# Patient Record
Sex: FEMALE | Race: WHITE
[De-identification: names, ages, dates, MRNs, and addresses within clinical notes are randomized per-mention and may not be internally consistent; named-entity substitution may affect disease eponyms.]

---

## 2020-08-16 ENCOUNTER — HOSPITAL ENCOUNTER (EMERGENCY)
Dept: HOSPITAL 26 - MED | Age: 40
Discharge: HOME | End: 2020-08-16
Payer: COMMERCIAL

## 2020-08-16 VITALS — WEIGHT: 237 LBS | HEIGHT: 64 IN | BODY MASS INDEX: 40.46 KG/M2

## 2020-08-16 VITALS — SYSTOLIC BLOOD PRESSURE: 177 MMHG | DIASTOLIC BLOOD PRESSURE: 108 MMHG

## 2020-08-16 VITALS — DIASTOLIC BLOOD PRESSURE: 108 MMHG | SYSTOLIC BLOOD PRESSURE: 177 MMHG
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
## 2020-08-16 DIAGNOSIS — M25.522: Primary | ICD-10-CM

## 2020-08-16 DIAGNOSIS — K75.89: ICD-10-CM

## 2020-08-16 DIAGNOSIS — I10: ICD-10-CM

## 2020-08-16 PROCEDURE — 90471 IMMUNIZATION ADMIN: CPT

## 2020-08-16 PROCEDURE — 99284 EMERGENCY DEPT VISIT MOD MDM: CPT

## 2020-08-16 PROCEDURE — 73070 X-RAY EXAM OF ELBOW: CPT

## 2020-08-16 PROCEDURE — 96372 THER/PROPH/DIAG INJ SC/IM: CPT

## 2020-08-16 PROCEDURE — 90715 TDAP VACCINE 7 YRS/> IM: CPT
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
## 2020-09-07 ENCOUNTER — HOSPITAL ENCOUNTER (EMERGENCY)
Dept: HOSPITAL 26 - MED | Age: 40
Discharge: HOME | End: 2020-09-07
Payer: COMMERCIAL

## 2020-09-07 VITALS — HEIGHT: 64 IN | BODY MASS INDEX: 40.87 KG/M2 | WEIGHT: 239.38 LBS

## 2020-09-07 VITALS — DIASTOLIC BLOOD PRESSURE: 116 MMHG | SYSTOLIC BLOOD PRESSURE: 187 MMHG

## 2020-09-07 DIAGNOSIS — L03.115: ICD-10-CM

## 2020-09-07 DIAGNOSIS — I10: ICD-10-CM

## 2020-09-07 DIAGNOSIS — R21: ICD-10-CM

## 2020-09-07 DIAGNOSIS — L03.116: Primary | ICD-10-CM

## 2020-09-07 NOTE — NUR
Patient discharged with v/s stable. Written and verbal after care instructions 
given and explained. 

Patient alert, oriented and verbalized understanding of instructions. 
Ambulatory with steady gait. All questions addressed prior to discharge. ID 
band removed. Patient advised to follow up with PMD. Rx of LISINOPRIL, KEFLEX, 
PREDNISONE given. Patient educated on indication of medication including 
possible reaction and side effects. Opportunity to ask questions provided and 
answered.

## 2021-11-17 ENCOUNTER — HOSPITAL ENCOUNTER (EMERGENCY)
Dept: HOSPITAL 26 - MED | Age: 41
Discharge: HOME | End: 2021-11-17
Payer: COMMERCIAL

## 2021-11-17 VITALS — BODY MASS INDEX: 39.61 KG/M2 | HEIGHT: 64 IN | WEIGHT: 232 LBS

## 2021-11-17 VITALS — SYSTOLIC BLOOD PRESSURE: 152 MMHG | DIASTOLIC BLOOD PRESSURE: 98 MMHG

## 2021-11-17 VITALS — DIASTOLIC BLOOD PRESSURE: 84 MMHG | SYSTOLIC BLOOD PRESSURE: 136 MMHG

## 2021-11-17 DIAGNOSIS — J18.9: Primary | ICD-10-CM

## 2021-11-17 DIAGNOSIS — Z20.822: ICD-10-CM

## 2021-11-17 DIAGNOSIS — R00.0: ICD-10-CM

## 2021-11-17 DIAGNOSIS — I10: ICD-10-CM

## 2021-11-17 DIAGNOSIS — Z79.2: ICD-10-CM

## 2021-11-17 LAB
ALBUMIN FLD-MCNC: 2.9 G/DL (ref 3.4–5)
ANION GAP SERPL CALCULATED.3IONS-SCNC: 17.1 MMOL/L (ref 8–16)
AST SERPL-CCNC: 27 U/L (ref 15–37)
BILIRUB SERPL-MCNC: 0.9 MG/DL (ref 0–1)
BUN SERPL-MCNC: 6 MG/DL (ref 7–18)
CHLORIDE SERPL-SCNC: 96 MMOL/L (ref 98–107)
CO2 SERPL-SCNC: 23 MMOL/L (ref 21–32)
CREAT SERPL-MCNC: 0.8 MG/DL (ref 0.6–1.3)
ERYTHROCYTE [DISTWIDTH] IN BLOOD BY AUTOMATED COUNT: 14.2 % (ref 11.6–13.7)
GFR SERPL CREATININE-BSD FRML MDRD: 101 ML/MIN (ref 90–?)
GLUCOSE SERPL-MCNC: 119 MG/DL (ref 74–106)
HCT VFR BLD AUTO: 36.7 % (ref 36–48)
HGB BLD-MCNC: 12.6 G/DL (ref 12–16)
LYMPHOCYTES NFR BLD MANUAL: 11 % (ref 20–46)
MCH RBC QN AUTO: 30 PG (ref 27–31)
MCHC RBC AUTO-ENTMCNC: 34 G/DL (ref 33–37)
MCV RBC AUTO: 86.6 FL (ref 80–94)
MONOCYTES NFR BLD MANUAL: 10 % (ref 5–12)
PLATELET # BLD AUTO: 235 K/UL (ref 140–450)
POTASSIUM SERPL-SCNC: 4.1 MMOL/L (ref 3.5–5.1)
RBC # BLD AUTO: 4.24 MIL/UL (ref 4.2–5.4)
SODIUM SERPL-SCNC: 132 MMOL/L (ref 136–145)
WBC # BLD AUTO: 10.9 K/UL (ref 4.8–10.8)

## 2021-11-17 PROCEDURE — 71045 X-RAY EXAM CHEST 1 VIEW: CPT

## 2021-11-17 PROCEDURE — 96361 HYDRATE IV INFUSION ADD-ON: CPT

## 2021-11-17 PROCEDURE — 93005 ELECTROCARDIOGRAM TRACING: CPT

## 2021-11-17 PROCEDURE — 85025 COMPLETE CBC W/AUTO DIFF WBC: CPT

## 2021-11-17 PROCEDURE — 96374 THER/PROPH/DIAG INJ IV PUSH: CPT

## 2021-11-17 PROCEDURE — 87426 SARSCOV CORONAVIRUS AG IA: CPT

## 2021-11-17 PROCEDURE — 87804 INFLUENZA ASSAY W/OPTIC: CPT

## 2021-11-17 PROCEDURE — 36415 COLL VENOUS BLD VENIPUNCTURE: CPT

## 2021-11-17 PROCEDURE — U0003 INFECTIOUS AGENT DETECTION BY NUCLEIC ACID (DNA OR RNA); SEVERE ACUTE RESPIRATORY SYNDROME CORONAVIRUS 2 (SARS-COV-2) (CORONAVIRUS DISEASE [COVID-19]), AMPLIFIED PROBE TECHNIQUE, MAKING USE OF HIGH THROUGHPUT TECHNOLOGIES AS DESCRIBED BY CMS-2020-01-R: HCPCS

## 2021-11-17 PROCEDURE — 99285 EMERGENCY DEPT VISIT HI MDM: CPT

## 2021-11-17 PROCEDURE — 80053 COMPREHEN METABOLIC PANEL: CPT

## 2021-11-17 RX ADMIN — Medication ONE MG: at 08:58

## 2021-11-17 RX ADMIN — SODIUM CHLORIDE ONE MLS/HR: 9 INJECTION, SOLUTION INTRAVENOUS at 08:59

## 2021-11-17 RX ADMIN — SODIUM CHLORIDE ONE MG: 9 INJECTION, SOLUTION INTRAVENOUS at 08:59

## 2021-11-17 RX ADMIN — AZITHROMYCIN DIHYDRATE ONE MG: 250 TABLET, FILM COATED ORAL at 10:24
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
## 2021-11-17 NOTE — NUR
Patient discharged with v/s stable. Written and verbal after care instructions 
given PNEUMONIA and explained. 

Patient alert, oriented and verbalized understanding of instructions. 
Ambulatory with steady gait. All questions addressed prior to discharge. ID 
band removed. Patient advised to follow up with PMD. Rx of AZITHROMYCIN given. 
Patient educated on indication of medication including possible reaction and 
side effects. Opportunity to ask questions provided and answered.

## 2021-11-17 NOTE — NUR
-------------------------------------------------------------------------------

            *** Note undone in ED - 11/17/21 at 0835 by [facility] ***            

-------------------------------------------------------------------------------

patient ambulated to bed 2. EKG AT BEDSIDE.

## 2021-11-17 NOTE — NUR
-------------------------------------------------------------------------------

            *** Note undone in ED - 11/17/21 at 0835 by [facility] ***            

-------------------------------------------------------------------------------

patient ambulated to bed 2. EKG AT BEDSIDE.

## 2021-11-17 NOTE — NUR
IV ESTABLISHED TO LEFT HAND 20G, GOOD BLOOD RETURN, COLLECTED BLOOD, COLLECTED 
COVID JAMES, COVID NOVEL, INFL A&B GAVE TO [place]  AT PT BEDSIDE.

## 2023-04-05 ENCOUNTER — HOSPITAL ENCOUNTER (EMERGENCY)
Dept: HOSPITAL 26 - MED | Age: 43
Discharge: HOME | End: 2023-04-05
Payer: COMMERCIAL

## 2023-04-05 VITALS — DIASTOLIC BLOOD PRESSURE: 84 MMHG | SYSTOLIC BLOOD PRESSURE: 147 MMHG

## 2023-04-05 VITALS — BODY MASS INDEX: 38.93 KG/M2 | WEIGHT: 228 LBS | HEIGHT: 64 IN
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
## 2023-04-05 DIAGNOSIS — S83.8X1A: Primary | ICD-10-CM

## 2023-04-05 DIAGNOSIS — S63.591A: ICD-10-CM
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
## 2023-04-05 PROCEDURE — 73562 X-RAY EXAM OF KNEE 3: CPT

## 2023-04-05 PROCEDURE — 96372 THER/PROPH/DIAG INJ SC/IM: CPT

## 2023-04-05 PROCEDURE — 73110 X-RAY EXAM OF WRIST: CPT

## 2023-04-05 PROCEDURE — 99284 EMERGENCY DEPT VISIT MOD MDM: CPT

## 2024-02-27 ENCOUNTER — HOSPITAL ENCOUNTER (EMERGENCY)
Dept: HOSPITAL 27 - EMS | Age: 44
Discharge: HOME | End: 2024-02-27
Payer: COMMERCIAL

## 2024-02-27 VITALS — HEART RATE: 80 BPM | DIASTOLIC BLOOD PRESSURE: 95 MMHG | RESPIRATION RATE: 20 BRPM | SYSTOLIC BLOOD PRESSURE: 155 MMHG

## 2024-02-27 VITALS — TEMPERATURE: 97.6 F

## 2024-02-27 VITALS — BODY MASS INDEX: 31.72 KG/M2 | WEIGHT: 190.39 LBS | HEIGHT: 65 IN
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
## 2024-02-27 DIAGNOSIS — S82.851A: Primary | ICD-10-CM

## 2024-02-27 DIAGNOSIS — F15.90: ICD-10-CM

## 2024-02-27 PROCEDURE — Z7502: HCPCS

## 2024-02-27 PROCEDURE — 99284 EMERGENCY DEPT VISIT MOD MDM: CPT

## 2024-02-27 PROCEDURE — 27818 TREATMENT OF ANKLE FRACTURE: CPT

## 2024-02-27 PROCEDURE — Z7610: HCPCS

## 2024-02-27 RX ADMIN — CEPHALEXIN ONE MG: 500 CAPSULE ORAL at 16:45

## 2024-02-27 RX ADMIN — OXYCODONE HYDROCHLORIDE ONE MG: 5 TABLET ORAL at 16:05

## 2024-02-27 RX ADMIN — BACITRACIN ONE APPL: 500 OINTMENT TOPICAL at 16:05
